# Patient Record
Sex: FEMALE | Race: ASIAN
[De-identification: names, ages, dates, MRNs, and addresses within clinical notes are randomized per-mention and may not be internally consistent; named-entity substitution may affect disease eponyms.]

---

## 2019-02-05 ENCOUNTER — HOSPITAL ENCOUNTER (OUTPATIENT)
Dept: HOSPITAL 92 - RAD | Age: 69
Discharge: HOME | End: 2019-02-05
Attending: INTERNAL MEDICINE
Payer: MEDICARE

## 2019-02-05 DIAGNOSIS — R91.8: ICD-10-CM

## 2019-02-05 DIAGNOSIS — R06.00: Primary | ICD-10-CM

## 2019-02-05 PROCEDURE — 71046 X-RAY EXAM CHEST 2 VIEWS: CPT

## 2019-02-05 NOTE — RAD
TWO VIEWS OF THE CHEST:

 

COMPARISON: 

None.

 

HISTORY: 

Dyspnea.

 

FINDINGS: 

Two views of the chest show a normal-size cardiomediastinal silhouette.  Diffuse increased interstiti
al lung markings are present.  There may be superimposed airspace opacities.  No pleural effusion is 
seen.

 

IMPRESSION: 

Bilateral diffuse mixed alveolar/interstitial opacities.  This could represent pulmonary edema or mul
tifocal infiltrates.

 

POS: [facility]